# Patient Record
Sex: FEMALE | Race: WHITE | NOT HISPANIC OR LATINO | URBAN - METROPOLITAN AREA
[De-identification: names, ages, dates, MRNs, and addresses within clinical notes are randomized per-mention and may not be internally consistent; named-entity substitution may affect disease eponyms.]

---

## 2023-08-24 ENCOUNTER — EMERGENCY (EMERGENCY)
Facility: HOSPITAL | Age: 43
LOS: 1 days | Discharge: HOME | End: 2023-08-24
Attending: EMERGENCY MEDICINE | Admitting: EMERGENCY MEDICINE
Payer: SELF-PAY

## 2023-08-24 VITALS
DIASTOLIC BLOOD PRESSURE: 91 MMHG | RESPIRATION RATE: 18 BRPM | HEART RATE: 80 BPM | SYSTOLIC BLOOD PRESSURE: 144 MMHG | OXYGEN SATURATION: 98 % | TEMPERATURE: 98 F
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
## 2023-08-24 DIAGNOSIS — T25.221A BURN OF SECOND DEGREE OF RIGHT FOOT, INITIAL ENCOUNTER: ICD-10-CM

## 2023-08-24 DIAGNOSIS — T31.0 BURNS INVOLVING LESS THAN 10% OF BODY SURFACE: ICD-10-CM
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
## 2023-08-24 DIAGNOSIS — M79.671 PAIN IN RIGHT FOOT: ICD-10-CM

## 2023-08-24 DIAGNOSIS — M79.672 PAIN IN LEFT FOOT: ICD-10-CM

## 2023-08-24 PROCEDURE — 99283 EMERGENCY DEPT VISIT LOW MDM: CPT

## 2023-08-24 PROCEDURE — 99053 MED SERV 10PM-8AM 24 HR FAC: CPT

## 2023-08-24 RX ORDER — BACITRACIN ZINC 500 UNIT/G
1 OINTMENT IN PACKET (EA) TOPICAL ONCE
Refills: 0 | Status: COMPLETED | OUTPATIENT
Start: 2023-08-24 | End: 2023-08-24

## 2023-08-24 RX ADMIN — Medication 1 APPLICATION(S): at 02:38

## 2023-08-24 NOTE — ED ADULT TRIAGE NOTE - CHIEF COMPLAINT QUOTE
Walk in pt with complaints of possible insect (spider) bite on bottom of right foot between her toes since yesterday.

## 2023-08-24 NOTE — ED PROVIDER NOTE - CLINICAL SUMMARY MEDICAL DECISION MAKING FREE TEXT BOX
Patient presenting to [facility] with friction blisters to feet  --Recommending bacitracin for open blisters  --Advised patient to obtain better foot care

## 2023-08-24 NOTE — ED ADULT NURSE NOTE - OBJECTIVE STATEMENT
Pt is A&OX4. Pt is homeless and stated she had 2 black  spider bits on her Right foot between the great toe, 2nd and 3rd toe. Are observes to be redden. Denies fever. No acute distress observed.

## 2023-08-24 NOTE — ED PROVIDER NOTE - NSFOLLOWUPINSTRUCTIONS_ED_ALL_ED_FT
Blister    WHAT YOU NEED TO KNOW:    What is a blister? A blister is a fluid-filled pocket on the surface of your skin. The fluid may be serum, blood, or other fluid, depending on what caused the blister. A layer of fluid is created to protect the skin until it heals. Blisters usually heal on their own within 2 weeks.    How should I care for my blister? Do not pop your blister or tear the skin on it. This could cause infection and slow the healing process. The following will help protect your blister area:    Wash your hands before you care for your blister. This will help prevent infection. Use soap and water, or a gel-based hand  if soap and water are not available.  Handwashing      Cover your blister with a bandage, if needed. A bandage can help prevent the blister from being torn or popped. If the blister does break open, a bandage can will keep the area clean prevent infection.  Use a bandage that is large enough to cover the entire blister. This will prevent the bandage from sticking to the blister. Your healthcare provider may tell you to use certain moist bandages or hydrogels.    You may need a bandage that absorbs well if your blister has a lot of fluid. You may be told to wear a second bandage for extra protection.    Carefully remove your bandage to care for the area. If the bandage sticks to your blister, pour saline on it. This will help the bandage come off more easily and prevent more skin damage.    Apply clean bandages as directed. Change your bandages when they get wet, dirty, or soaked with fluid.    Keep the blister area clean and dry. Wash the area with soap or saline and water. Gently pat the area dry. Look for signs of infection, such as redness, swelling, or pus.  What can I do to prevent a blister?    Apply a thick skin cream or ointment. This can help prevent friction. Your healthcare provider may recommend a certain product.    Choose clothes that do not bind, rub, or irritate your skin. Some fabrics can prevent blisters by wicking moisture away from your skin. Choose fabric that allows air to flow around your skin. Light clothing that fits loosely prevents friction as you move. Wear work gloves when you use tools such as a rake or a hammer.    Protect your feet. Keep your feet clean and dry during the day. Wear shoes that fit well. Shoes that rub your feet may cause or worsen blisters. Cushioned insoles, padding, or moleskin can keep your feet from rubbing in your shoes. You can also wear 2 pairs of socks to give your feet more protection.    Use padding to protect pressure areas, such as your heels. Gauze, moleskin, or similar padding can prevent or relieve pressure. You can also use padding to protect an area that has a blister. Make a hole in the middle of the padding. Place the padding so the blister goes through the hole. Cover the entire area with a bandage.    Stop your activity if a red area forms. Red or painful skin during activity may be a sign that a blister could form. You may need to stop and add padding or a bandage to the area. You may need to change clothing or your shoes.  When should I seek immediate care?    You see signs of infection, such as red streaks, pus, or swelling.    When should I call my doctor?    You have increased pain, redness, and swelling in the blister area.    You notice a bad-smelling fluid coming from your blister.    The blister does not heal within 2 weeks, or when your healthcare provider recommended.    You have a fever, chills, or body aches.    You have questions or concerns about your condition or care.  CARE AGREEMENT:    You have the right to help plan your care. Learn about your health condition and how it may be treated. Discuss treatment options with your healthcare providers to decide what care you want to receive. You always have the right to refuse treatment.

## 2023-08-24 NOTE — ED PROVIDER NOTE - OBJECTIVE STATEMENT
42-year-old female with no past medical history presenting to the emergency with bilateral feet pain.  Patient states that she may have been bitten by a spider.  Denies any injuries, fevers.

## 2023-08-24 NOTE — ED PROVIDER NOTE - PATIENT PORTAL LINK FT
You can access the FollowMyHealth Patient Portal offered by [facility] by registering at the following website: [email or web address]. By joining [facility]’s FollowMyHealth portal, you will also be able to view your health information using other applications (apps) compatible with our system.

## 2023-08-24 NOTE — ED ADULT NURSE NOTE - CAS ELECT INFOMATION PROVIDED
this RN assumed pt care after pt already discharged by previous RN although pt took awhile to physically leave/sleeping; this RN was not able to meet/assess pt but pt was noted to have already left ED at this time; discharge paperwork was given by previous RN/DC instructions

## 2023-08-24 NOTE — ED PROVIDER NOTE - PHYSICAL EXAMINATION
Gen: well appearing, NAD  HEENT: no stridor, mucous membranes moist  CV: regular rate and rhythm  Lungs: no resp distress, nml effort  Abd: nondistended, flat  Ext: no evidence of bites/infestation, bilateral corns/blisters, poor hygiene   Neuro: A&Ox3, moving all extremities symmetrically, nml gait  Skin: no rash, no pallor  Psych: not responding to internal stimuli, appropriate affect

## 2023-08-24 NOTE — ED ADULT NURSE NOTE - NSFALLUNIVINTERV_ED_ALL_ED
Bed/Stretcher in lowest position, wheels locked, appropriate side rails in place/Call bell, personal items and telephone in reach/Instruct patient to call for assistance before getting out of bed/chair/stretcher/Non-slip footwear applied when patient is off stretcher/[place] to call system/Physically safe environment - no spills, clutter or unnecessary equipment/Purposeful proactive rounding/Room/bathroom lighting operational, light cord in reach

## 2024-09-06 NOTE — ED ADULT NURSE NOTE - NS ED NOTE  TALK SOMEONE YN
You have a muscle strain of the back - this is common    Recommendations: light stretching, heat therapy using a heating pack or warm wash cloth, muscle rubs such as biofreeze or icy hot  Medications: ibuprofen 600 mg every 6-8 hours (or alternative NSAID IF TOLERATED) alternating with tylenol 500 mg every 4-6 hours for pain control, short course of muscle relaxants as needed    Please follow up with your doctor    Return to the ER for any groin numbness, losing control or inability to urinate or have a bowel movement, weakness, fever, or any concerning symptoms    
No